# Patient Record
Sex: MALE | Race: OTHER | HISPANIC OR LATINO | URBAN - METROPOLITAN AREA
[De-identification: names, ages, dates, MRNs, and addresses within clinical notes are randomized per-mention and may not be internally consistent; named-entity substitution may affect disease eponyms.]

---

## 2017-01-31 ENCOUNTER — EMERGENCY (EMERGENCY)
Facility: HOSPITAL | Age: 27
LOS: 1 days | Discharge: HOME | End: 2017-01-31
Attending: EMERGENCY MEDICINE | Admitting: EMERGENCY MEDICINE
Payer: OTHER MISCELLANEOUS

## 2017-01-31 VITALS
HEART RATE: 89 BPM | TEMPERATURE: 99 F | RESPIRATION RATE: 20 BRPM | OXYGEN SATURATION: 99 % | SYSTOLIC BLOOD PRESSURE: 132 MMHG | DIASTOLIC BLOOD PRESSURE: 72 MMHG

## 2017-01-31 LAB
ALBUMIN SERPL ELPH-MCNC: 5 G/DL — SIGNIFICANT CHANGE UP (ref 3.3–5)
ALP SERPL-CCNC: 73 U/L — SIGNIFICANT CHANGE UP (ref 40–120)
ALT FLD-CCNC: 32 U/L — SIGNIFICANT CHANGE UP (ref 4–41)
AST SERPL-CCNC: 24 U/L — SIGNIFICANT CHANGE UP (ref 4–40)
BILIRUB SERPL-MCNC: 0.7 MG/DL — SIGNIFICANT CHANGE UP (ref 0.2–1.2)
BUN SERPL-MCNC: 18 MG/DL — SIGNIFICANT CHANGE UP (ref 7–23)
BUN SERPL-MCNC: 18 MG/DL — SIGNIFICANT CHANGE UP (ref 7–23)
CALCIUM SERPL-MCNC: 9.8 MG/DL — SIGNIFICANT CHANGE UP (ref 8.4–10.5)
CALCIUM SERPL-MCNC: 9.8 MG/DL — SIGNIFICANT CHANGE UP (ref 8.4–10.5)
CHLORIDE SERPL-SCNC: 97 MMOL/L — LOW (ref 98–107)
CHLORIDE SERPL-SCNC: 97 MMOL/L — LOW (ref 98–107)
CO2 SERPL-SCNC: 24 MMOL/L — SIGNIFICANT CHANGE UP (ref 22–31)
CO2 SERPL-SCNC: 24 MMOL/L — SIGNIFICANT CHANGE UP (ref 22–31)
CREAT SERPL-MCNC: 1.23 MG/DL — SIGNIFICANT CHANGE UP (ref 0.5–1.3)
CREAT SERPL-MCNC: 1.23 MG/DL — SIGNIFICANT CHANGE UP (ref 0.5–1.3)
GLUCOSE SERPL-MCNC: 121 MG/DL — HIGH (ref 70–99)
GLUCOSE SERPL-MCNC: 121 MG/DL — HIGH (ref 70–99)
HBV SURFACE AG SER-ACNC: NEGATIVE — SIGNIFICANT CHANGE UP
HIV1 AG SER QL: SIGNIFICANT CHANGE UP
HIV1+2 AB SPEC QL: SIGNIFICANT CHANGE UP
POTASSIUM SERPL-MCNC: 3.8 MMOL/L — SIGNIFICANT CHANGE UP (ref 3.5–5.3)
POTASSIUM SERPL-MCNC: 3.8 MMOL/L — SIGNIFICANT CHANGE UP (ref 3.5–5.3)
POTASSIUM SERPL-SCNC: 3.8 MMOL/L — SIGNIFICANT CHANGE UP (ref 3.5–5.3)
POTASSIUM SERPL-SCNC: 3.8 MMOL/L — SIGNIFICANT CHANGE UP (ref 3.5–5.3)
PROT SERPL-MCNC: 7.9 G/DL — SIGNIFICANT CHANGE UP (ref 6–8.3)
SODIUM SERPL-SCNC: 139 MMOL/L — SIGNIFICANT CHANGE UP (ref 135–145)
SODIUM SERPL-SCNC: 139 MMOL/L — SIGNIFICANT CHANGE UP (ref 135–145)

## 2017-01-31 PROCEDURE — 99284 EMERGENCY DEPT VISIT MOD MDM: CPT
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
## 2017-02-01 LAB
HAV IGM SER-ACNC: NONREACTIVE — SIGNIFICANT CHANGE UP
HBV CORE IGM SER-ACNC: NONREACTIVE — SIGNIFICANT CHANGE UP
HBV SURFACE AG SER-ACNC: NONREACTIVE — SIGNIFICANT CHANGE UP
HCV AB S/CO SERPL IA: 0.1 S/CO — SIGNIFICANT CHANGE UP
HCV AB SERPL-IMP: SIGNIFICANT CHANGE UP
T PALLIDUM AB TITR SER: NEGATIVE — SIGNIFICANT CHANGE UP

## 2020-05-29 ENCOUNTER — EMERGENCY (EMERGENCY)
Facility: HOSPITAL | Age: 30
LOS: 1 days | Discharge: HOME | End: 2020-05-29
Attending: EMERGENCY MEDICINE | Admitting: EMERGENCY MEDICINE
Payer: OTHER MISCELLANEOUS

## 2020-05-29 VITALS
HEART RATE: 77 BPM | OXYGEN SATURATION: 100 % | TEMPERATURE: 98 F | SYSTOLIC BLOOD PRESSURE: 120 MMHG | DIASTOLIC BLOOD PRESSURE: 73 MMHG | RESPIRATION RATE: 16 BRPM

## 2020-05-29 PROCEDURE — 99283 EMERGENCY DEPT VISIT LOW MDM: CPT

## 2020-05-29 RX ORDER — TETANUS TOXOID, REDUCED DIPHTHERIA TOXOID AND ACELLULAR PERTUSSIS VACCINE, ADSORBED 5; 2.5; 8; 8; 2.5 [IU]/.5ML; [IU]/.5ML; UG/.5ML; UG/.5ML; UG/.5ML
0.5 SUSPENSION INTRAMUSCULAR ONCE
Refills: 0 | Status: COMPLETED | OUTPATIENT
Start: 2020-05-29 | End: 2020-05-29

## 2020-05-29 RX ADMIN — Medication 1 TABLET(S): at 11:46

## 2020-05-29 RX ADMIN — TETANUS TOXOID, REDUCED DIPHTHERIA TOXOID AND ACELLULAR PERTUSSIS VACCINE, ADSORBED 0.5 MILLILITER(S): 5; 2.5; 8; 8; 2.5 SUSPENSION INTRAMUSCULAR at 11:46

## 2020-05-29 NOTE — ED PROVIDER NOTE - PLAN OF CARE
1. YOU RECEIVED A TETANUS VACCINE TODAY--IT WILL LAST FOR 10 YEARS.  2. A PRESCRIPTION FOR ANTIBIOTICS WAS SENT TO THE PHARMACY.  PLEASE TAKE ALL ANTIBIOTICS AS DIRECTED UNTIL THEY ARE FINISHED.  3. KEEP THE WOUND CLEAN AND DRY AND COVER WITH ANTIBIOTIC OINTMENT (I.E. BACITRACIN OR NEOSPORIN) TWICE A DAY FOR 5 DAYS.  4. FOLLOW UP WITH A DOCTOR FOR A WOUND CHECK WITHIN 1 WEEK.  5. RETURN TO THE ER FOR ANY CONCERNS.
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
## 2020-05-29 NOTE — ED PROVIDER NOTE - PATIENT PORTAL LINK FT
You can access the FollowMyHealth Patient Portal offered by Health system by registering at the following website: [email or web address]. By joining [facility]’s FollowMyHealth portal, you will also be able to view your health information using other applications (apps) compatible with our system.

## 2020-05-29 NOTE — ED PROVIDER NOTE - MUSCULOSKELETAL MINIMAL EXAM
Midline neck and back nontender, full ROM to all extremities , R shoulder and axilla with minor generalized TTP, left shoulder with full ROM
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
## 2020-05-29 NOTE — ED PROVIDER NOTE - PROGRESS NOTE DETAILS
Dr. Shahid: tetanus vaccine and first dose of abx given in ED; remainder of abx sent to pharmacy; wound covered in bacitracin and gauze in ED; pt to be discharged with instructions for wound care at home and follow up within 1 week for check of wound

## 2021-10-12 NOTE — ED PROVIDER NOTE - ENMT, MLM
Airway patent, Nasal mucosa clear. Mouth with normal mucosa. Throat has no vesicles, no oropharyngeal exudates and uvula is midline.
negative...

## 2023-05-15 NOTE — ED ADULT TRIAGE NOTE - MEANS OF ARRIVAL
Problem: Stroke: Ischemic (Transient/Permanent)  Goal: Neurological status is maintained/restored to status at baseline  Description: Monitor neurological and mental status including symptoms of increasing intracranial pressure (headache, nausea/vomiting, or change in behavior). Hypertension (greater than 180 systolic) may also indicate a change in status related to stroke.  Outcome: Outcome Not Met, Continue to Monitor  Goal: Normal temperature is maintained  Outcome: Outcome Not Met, Continue to Monitor  Goal: Elimination status is maintained/returned to baseline  Description: Remove indwelling urinary catheter as soon as possible or collaborate with provider for order/reason for continued use.   Outcome: Outcome Not Met, Continue to Monitor  Goal: #Depressive s/s (self-reported/observed) are recognized and monitored  Outcome: Outcome Not Met, Continue to Monitor  Goal: Personal stroke risk factors are identified with initial plan for risk reduction  Description: Stroke risk reduction involves taking medication, changing diet, increasing physical exercise, smoking cessation, or alcohol/drug use reduction/cessation based on identified risks.  Outcome: Outcome Not Met, Continue to Monitor  Goal: Verbalizes understanding of condition and treatment plan  Description: Document on Patient Education Activity  Outcome: Outcome Not Met, Continue to Monitor      ambulatory

## 2025-04-02 NOTE — ED ADULT NURSE NOTE - TEMPLATE LIST FOR HEAD TO TOE ASSESSMENT
Medication: lovastatin passed protocol.   Last office visit date: 3/10/2025  Next appointment scheduled?: Yes   Number of refills given: 3    
General